# Patient Record
Sex: MALE | Race: WHITE | Employment: FULL TIME | URBAN - METROPOLITAN AREA
[De-identification: names, ages, dates, MRNs, and addresses within clinical notes are randomized per-mention and may not be internally consistent; named-entity substitution may affect disease eponyms.]

---

## 2020-01-25 ENCOUNTER — APPOINTMENT (OUTPATIENT)
Dept: CT IMAGING | Facility: CLINIC | Age: 40
End: 2020-01-25
Attending: EMERGENCY MEDICINE
Payer: COMMERCIAL

## 2020-01-25 ENCOUNTER — APPOINTMENT (OUTPATIENT)
Dept: GENERAL RADIOLOGY | Facility: CLINIC | Age: 40
End: 2020-01-25
Attending: EMERGENCY MEDICINE
Payer: COMMERCIAL

## 2020-01-25 ENCOUNTER — HOSPITAL ENCOUNTER (EMERGENCY)
Facility: CLINIC | Age: 40
Discharge: HOME | End: 2020-01-25
Attending: EMERGENCY MEDICINE | Admitting: EMERGENCY MEDICINE
Payer: COMMERCIAL

## 2020-01-25 VITALS
BODY MASS INDEX: 29.16 KG/M2 | SYSTOLIC BLOOD PRESSURE: 114 MMHG | HEIGHT: 73 IN | RESPIRATION RATE: 18 BRPM | TEMPERATURE: 98.5 F | HEART RATE: 92 BPM | OXYGEN SATURATION: 93 % | DIASTOLIC BLOOD PRESSURE: 70 MMHG | WEIGHT: 220 LBS

## 2020-01-25 DIAGNOSIS — S06.9X1A MILD TRAUMATIC BRAIN INJURY, WITH LOSS OF CONSCIOUSNESS OF 30 MINUTES OR LESS, INITIAL ENCOUNTER (H): ICD-10-CM
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
## 2020-01-25 DIAGNOSIS — R91.8 PULMONARY NODULES: ICD-10-CM

## 2020-01-25 PROCEDURE — 70450 CT HEAD/BRAIN W/O DYE: CPT

## 2020-01-25 PROCEDURE — 99284 EMERGENCY DEPT VISIT MOD MDM: CPT | Mod: Z6 | Performed by: EMERGENCY MEDICINE

## 2020-01-25 PROCEDURE — 99284 EMERGENCY DEPT VISIT MOD MDM: CPT | Mod: 25

## 2020-01-25 PROCEDURE — 71045 X-RAY EXAM CHEST 1 VIEW: CPT

## 2020-01-25 PROCEDURE — 25000132 ZZH RX MED GY IP 250 OP 250 PS 637: Performed by: EMERGENCY MEDICINE

## 2020-01-25 RX ORDER — ACETAMINOPHEN 500 MG
1000 TABLET ORAL ONCE
Status: COMPLETED | OUTPATIENT
Start: 2020-01-25 | End: 2020-01-25

## 2020-01-25 RX ADMIN — ACETAMINOPHEN 1000 MG: 500 TABLET, FILM COATED ORAL at 22:05

## 2020-01-25 ASSESSMENT — MIFFLIN-ST. JEOR: SCORE: 1966.79

## 2020-01-25 NOTE — ED AVS SNAPSHOT
[facility] Emergency Department  [address] [address]  Phone:  [phone]  Fax:  [phone]                                    Cale Grant   MRN: [id number]    Department:  [facility] Emergency Department   Date of Visit:  1/25/2020           After Visit Summary Signature Page    I have received my discharge instructions, and my questions have been answered. I have discussed any challenges I see with this plan with the nurse or doctor.    ..........................................................................................................................................  Patient/Patient Representative Signature      ..........................................................................................................................................  Patient Representative Print Name and Relationship to Patient    ..................................................               ................................................  Date                                   Time    ..........................................................................................................................................  Reviewed by Signature/Title    ...................................................              ..............................................  Date                                               Time          22EPIC Rev 08/18

## 2020-01-26 NOTE — DISCHARGE INSTRUCTIONS
Use acetaminophen and ibuprofen as needed for pain.  Drink plenty fluids.  Make sure you are getting plenty of rest at night.  Follow-up in the concussion clinic for a recheck.  Return to the emergency department sooner if you have worsening pain, repeated vomiting, difficulty breathing, or other concerns.  There was a nodule seen on your chest x-ray that could have been a shadow from your nipple.  However this requires a recheck in clinic in 3 to 4 weeks.    You have been examined for a head injury and possible concussion.    Your CT scan of your brain showed no abnormalities.    Most people recover quickly and completely.  During recovery you may have a range of symptoms that appear right away, or hours or days after your injury.  Most symptoms go away without treatment within 5-7 days.    Here is a list of things you may or may not experience:  Difficulty thinking clearly, Feeling slowed down, Trouble concentrating  Headache  Balance problems, Blurred vision, Dizziness  Nausea  Irritability, Nervousness, Sadness  Sleeping more or less than usual    When to return to the hospital  Repeated vomiting  Worsening or severe headache  Unable to stay awake  More confused   If you have a seizure  Difficulty walking  Difficulty with your vision  Any other symptom that concerns you or your family    How to feel better:  Get plenty of rest and sleep  Do not drink alcohol  Cognitive rest  Refrain from all activities that make your symptoms worse  These include video games, working on a computer, texting on a cell phone, listening to loud music  These do not include watching movies, television, reading books, or listening to music quietly  However if any activity makes your symptoms worse, it is a good idea to stop and give your brain a rest  Physical rest  Feet on the ground, walking only  No gym class, recess, sports, or strenuous chores/work    Please schedule an appointment in 2-4 weeks to be evaluated.
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
